# Patient Record
Sex: MALE
[De-identification: names, ages, dates, MRNs, and addresses within clinical notes are randomized per-mention and may not be internally consistent; named-entity substitution may affect disease eponyms.]

---

## 2018-05-18 ENCOUNTER — HOSPITAL ENCOUNTER (EMERGENCY)
Dept: HOSPITAL 68 - ERH | Age: 23
LOS: 1 days | End: 2018-05-19
Payer: COMMERCIAL

## 2018-05-18 VITALS — BODY MASS INDEX: 31.39 KG/M2 | HEIGHT: 67 IN | WEIGHT: 200 LBS
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
## 2018-05-18 DIAGNOSIS — S83.92XA: Primary | ICD-10-CM
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
## 2018-05-19 VITALS — SYSTOLIC BLOOD PRESSURE: 159 MMHG | DIASTOLIC BLOOD PRESSURE: 102 MMHG

## 2018-05-19 NOTE — ED UPPER/LOWER EXTREMITY COMPL
History of Present Illness
 
General
Chief Complaint: Lower Extremity Injury
Stated Complaint: LEFT KNEE PAIN S/P PLAYING BASKET BALL
Source: patient
Exam Limitations: no limitations
 
Vital Signs & Intake/Output
Vital Signs & Intake/Output
 Vital Signs
 
 
Date Time Temp Pulse Resp B/P B/P Pulse O2 O2 Flow FiO2
 
     Mean Ox Delivery Rate 
 
 001 98.2 76 16 159/102     
 
 
 
Allergies
Coded Allergies:
NO KNOWN ALLERGIES (18)
 
Reconcile Medications
Naproxen (Naprosyn) 500 MG TABLET   1 TAB PO BID PAIN
 
Triage Note:
23YOMALE TO TRIAGE W/CO L KNEE PAIN SP "LANDING
 WRONG WHILE PLAYING BASKETBALL TONITE"
Triage Nurses Notes Reviewed? yes
HPI:
Patient presents for evaluation of a knee injury that occurred earlier this 
evening as a result of basketball.  Patient states he twisted the knee with a 
resulting bilateral knee pain but no apparent swelling ecchymoses or erythema.  
The pain began abruptly and is described as mild and worse with attempts to 
ambulate.  It is an aching and sharp pain. 
 
Past History
 
Travel History
Traveled to [place] past 21 day No
 
Medical History
Any Pertinent Medical History? see below for history
Neurological: NONE
EENT: NONE
Cardiovascular: NONE
Respiratory: NONE
Gastrointestinal: NONE
Hepatic: NONE
Renal: NONE
Musculoskeletal: NONE
Psychiatric: NONE
Endocrine: NONE
Blood Disorders: NONE
Cancer(s): NONE
GYN/Reproductive: NONE
 
Surgical History
Surgical History: non-contributory
 
Psychosocial History
What is your primary language English
Tobacco Use: Quit >30 days ago
 
Family History
Hx Contributory? No
 
Review of Systems
 
Review of Systems
Constitutional:
Reports: no symptoms. 
EENTM:
Reports: no symptoms. 
Respiratory:
Reports: no symptoms. 
Cardiovascular:
Reports: no symptoms. 
Gastrointestinal/Abdominal:
Reports: no symptoms. 
Genitourinary:
Reports: no symptoms. 
Musculoskeletal:
Reports: see HPI. 
Skin:
Reports: no symptoms. 
Neurological/Psychological:
Reports: no symptoms. 
Hematologic/Endocrine:
Reports: no symptoms. 
Immunological:
Reports: no symptoms. 
All Other Systems: Reviewed and Negative
 
Physical Exam
 
Physical Exam
General Appearance: SEE BELOW
Comments:
Gen.: Well-nourished, well-developed, no acute respiratory distress.
Head: Normocephalic, atraumatic.
Eyes: Normal inspection bilaterally
Ears: Normal inspection bilaterally
Nose: Normal inspection
Throat/mouth : Moist mucosa 
Neck: Supple, full range of motion, no goiter
Heart: Regular rate and rhythm
Lungs: Quiet respirations
Back: Normal range of motion
Extremities: Left knee: Mild effusion without ecchymoses or erythema or warmth. 
Decreased range of motion secondary to pain.  Nontender over the collateral 
ligaments.  No drawer sign.  The left lower cavity is neurovascular intact 
distally.
Neurologic: Cranial nerves grossly intact, speech is clear
Skin: warm and dry
Psychiatric: Calm, cooperative, no apparent delusions or hallucinations
 
Progress
Differential Diagnosis: contusion, dislocation, fracture, sprain, tendon injury
Plan of Care:
 Orders
 
 
Procedure Date/time Status
 
Durable Medical Equipment 132 Active
 
 
 
Radiology Impression: PATIENT: LATRICIA RIOS  MEDICAL RECORD NO: [id number] 
PRESENT AGE: 23  PATIENT ACCOUNT NO: [id number] : 94  LOCATION: [facility] 
ORDERING PHYSICIAN: Isma Carmona MD     SERVICE DATE:  EXAM TYPE
: RAD - XRY-KNEE COMPLETE LEFT EXAMINATION: LEFT KNEE 3 VIEWS CLINICAL 
INFORMATION: Left knee pain following injury. COMPARISON: None. TECHNIQUE: AP, 
lateral, oblique views of the left knee were obtained. FINDINGS: There are no 
fractures or dislocations. There is no knee joint effusion. There is no 
significant soft tissue swelling. IMPRESSION: Unremarkable left knee 
radiographs. DICTATED BY: Brennon Kennedy MD  DATE/TIME DICTATED:18 
:RAD.ERIC  DATE/TIME TRANSCRIBED:18 CONFIDENTIAL, 
DO NOT COPY WITHOUT APPROPRIATE AUTHORIZATION.  <Electronically signed in Other 
Vendor System>                                                                  
                     SIGNED BY: Brennon Kennedy MD 18
 
Departure
 
Departure
Disposition: HOME OR SELF CARE
Condition: Stable
Clinical Impression
Primary Impression: Left knee sprain
Referrals:
Skyler MARTIN,Sundeep OLEARY (PCP/Family)
 
Additional Instructions:
Knee immobilizer when active.  Ice and elevation over the next 48 hours.  
Naprosyn as prescribed.  Activity as tolerated but in regards to your job, do 
not work over the next 72 hours and then a restricted duty after that.  If your 
knee pain does not begin to resolve at that point you will require reevaluation 
by either your primary care physician for additional testing or an orthopedic 
specialist such as Dr. Escamilla listed below.  Otherwise return if any concerns or
sudden worsening.
 
Please note that there might be incidental findings in your evaluation that are 
unrelated to the current emergency department visit.  Please notify your primary
care doctor about this emergency department visit in order to obtain and review 
all of the testing performed so that these incidental findings can be monitored 
as needed.
 
If you had an x-ray performed, please understand that some fractures may not be 
seen on the initial set of x-rays.  If your symptoms persist you might need a 
repeat set of x-rays to check for such a fracture.
 
If you had a laceration evaluated, please understand that foreign bodies such as
glass or wood may not be visible to the naked eye or on plain x-rays.  If the 
wound becomes red, swollen, increasingly more painful or if there is any 
drainage from the wound, please have it reevaluated by a physician for the 
possibility of a retained foreign body.
 
*****If you're unable to follow up as outlined in the discharge instructions 
please return to the emergency department.******
 
Thank you for choosing the [facility] Emergency Department for your care.
It was a pleasure to serve you today.
 
Isma Carmona M.D.
[facility]
 
Departure Forms:
Customer Survey
General Discharge Information
Prescriptions:
Current Visit Scripts
Naproxen (Naprosyn) 1 TAB PO BID  
     #20 TAB

## 2018-05-19 NOTE — RADIOLOGY REPORT
EXAMINATION:
LEFT KNEE 3 VIEWS
 
CLINICAL INFORMATION:
Left knee pain following injury.
 
COMPARISON:
None.
 
TECHNIQUE:
AP, lateral, oblique views of the left knee were obtained.
 
FINDINGS:
There are no fractures or dislocations. There is no knee joint effusion.
There is no significant soft tissue swelling.
 
IMPRESSION:
Unremarkable left knee radiographs.